# Patient Record
Sex: FEMALE | Race: WHITE | NOT HISPANIC OR LATINO | Employment: PART TIME | URBAN - METROPOLITAN AREA
[De-identification: names, ages, dates, MRNs, and addresses within clinical notes are randomized per-mention and may not be internally consistent; named-entity substitution may affect disease eponyms.]

---

## 2018-03-26 ENCOUNTER — TRANSCRIBE ORDERS (OUTPATIENT)
Dept: NUTRITION | Facility: HOSPITAL | Age: 58
End: 2018-03-26

## 2018-03-26 DIAGNOSIS — E66.01 MORBID OBESITY (HCC): Primary | ICD-10-CM

## 2018-04-13 ENCOUNTER — HOSPITAL ENCOUNTER (OUTPATIENT)
Dept: NUTRITION | Facility: HOSPITAL | Age: 58
Setting detail: RECURRING SERIES
Discharge: HOME | End: 2018-04-13

## 2018-04-13 VITALS — BODY MASS INDEX: 45.99 KG/M2 | HEIGHT: 67 IN | WEIGHT: 293 LBS

## 2018-04-13 PROCEDURE — 97802 MEDICAL NUTRITION INDIV IN: CPT | Performed by: DIETITIAN, REGISTERED

## 2018-05-11 ENCOUNTER — HOSPITAL ENCOUNTER (OUTPATIENT)
Dept: NUTRITION | Facility: HOSPITAL | Age: 58
Setting detail: RECURRING SERIES
End: 2018-05-11

## 2018-05-15 ENCOUNTER — TELEPHONE (OUTPATIENT)
Dept: NUTRITION | Facility: HOSPITAL | Age: 58
End: 2018-05-15

## 2018-05-16 NOTE — PROGRESS NOTES
Patient cancelled follow up appointment with RD due to family emergency. RD spoke with patient over the phone on her progress since coming in for initial appointment. Patient states she continues to do well with her weight loss goals. Reports she is still trying to focus on raw/whole/fresh foods and avoid packaged and processed foods. States she is juicing ~1 time/day. She does not know a current weight but states she knows she has lost weight this month as her clothes continue to feel more loose. Patient is away from home and does not have her calendar with her to reschedule appointment at this time and she will call back. She has no questions for RD today.     Goal completion:  1. Keep 1 week of food logs: 100%  2. Lose weight: 100% self reported    Patient is encouraged to call RD as needed. Thank you again for this referral.

## 2019-11-14 ENCOUNTER — PREP FOR SURGERY (OUTPATIENT)
Dept: OTHER | Facility: HOSPITAL | Age: 59
End: 2019-11-14

## 2019-11-14 DIAGNOSIS — N95.0 POST-MENOPAUSAL BLEEDING: Primary | ICD-10-CM

## 2019-11-15 ENCOUNTER — APPOINTMENT (OUTPATIENT)
Dept: PREADMISSION TESTING | Facility: HOSPITAL | Age: 59
End: 2019-11-15

## 2019-11-15 LAB
ALBUMIN SERPL-MCNC: 4.4 G/DL (ref 3.5–5.2)
ALBUMIN/GLOB SERPL: 1.3 G/DL
ALP SERPL-CCNC: 141 U/L (ref 39–117)
ALT SERPL W P-5'-P-CCNC: 41 U/L (ref 1–33)
ANION GAP SERPL CALCULATED.3IONS-SCNC: 11 MMOL/L (ref 5–15)
AST SERPL-CCNC: 36 U/L (ref 1–32)
BASOPHILS # BLD AUTO: 0.04 10*3/MM3 (ref 0–0.2)
BASOPHILS NFR BLD AUTO: 0.5 % (ref 0–1.5)
BILIRUB SERPL-MCNC: 1 MG/DL (ref 0.2–1.2)
BUN BLD-MCNC: 10 MG/DL (ref 6–20)
BUN/CREAT SERPL: 15.4 (ref 7–25)
CALCIUM SPEC-SCNC: 9.4 MG/DL (ref 8.6–10.5)
CHLORIDE SERPL-SCNC: 99 MMOL/L (ref 98–107)
CO2 SERPL-SCNC: 27 MMOL/L (ref 22–29)
CREAT BLD-MCNC: 0.65 MG/DL (ref 0.57–1)
DEPRECATED RDW RBC AUTO: 40.5 FL (ref 37–54)
EOSINOPHIL # BLD AUTO: 0.2 10*3/MM3 (ref 0–0.4)
EOSINOPHIL NFR BLD AUTO: 2.3 % (ref 0.3–6.2)
ERYTHROCYTE [DISTWIDTH] IN BLOOD BY AUTOMATED COUNT: 13.2 % (ref 12.3–15.4)
GFR SERPL CREATININE-BSD FRML MDRD: 94 ML/MIN/1.73
GLOBULIN UR ELPH-MCNC: 3.5 GM/DL
GLUCOSE BLD-MCNC: 210 MG/DL (ref 65–99)
HBA1C MFR BLD: 10.1 % (ref 4.8–5.6)
HCT VFR BLD AUTO: 48.4 % (ref 34–46.6)
HGB BLD-MCNC: 15.2 G/DL (ref 12–15.9)
IMM GRANULOCYTES # BLD AUTO: 0.03 10*3/MM3 (ref 0–0.05)
IMM GRANULOCYTES NFR BLD AUTO: 0.3 % (ref 0–0.5)
LYMPHOCYTES # BLD AUTO: 1.98 10*3/MM3 (ref 0.7–3.1)
LYMPHOCYTES NFR BLD AUTO: 22.5 % (ref 19.6–45.3)
MCH RBC QN AUTO: 26.7 PG (ref 26.6–33)
MCHC RBC AUTO-ENTMCNC: 31.4 G/DL (ref 31.5–35.7)
MCV RBC AUTO: 85.1 FL (ref 79–97)
MONOCYTES # BLD AUTO: 0.71 10*3/MM3 (ref 0.1–0.9)
MONOCYTES NFR BLD AUTO: 8.1 % (ref 5–12)
NEUTROPHILS # BLD AUTO: 5.85 10*3/MM3 (ref 1.7–7)
NEUTROPHILS NFR BLD AUTO: 66.3 % (ref 42.7–76)
NRBC BLD AUTO-RTO: 0 /100 WBC (ref 0–0.2)
PLATELET # BLD AUTO: 210 10*3/MM3 (ref 140–450)
PMV BLD AUTO: 10.9 FL (ref 6–12)
POTASSIUM BLD-SCNC: 4.4 MMOL/L (ref 3.5–5.2)
PROT SERPL-MCNC: 7.9 G/DL (ref 6–8.5)
RBC # BLD AUTO: 5.69 10*6/MM3 (ref 3.77–5.28)
SODIUM BLD-SCNC: 137 MMOL/L (ref 136–145)
WBC NRBC COR # BLD: 8.81 10*3/MM3 (ref 3.4–10.8)

## 2019-11-15 PROCEDURE — 93005 ELECTROCARDIOGRAM TRACING: CPT

## 2019-11-15 PROCEDURE — 93010 ELECTROCARDIOGRAM REPORT: CPT | Performed by: INTERNAL MEDICINE

## 2019-11-15 PROCEDURE — 85025 COMPLETE CBC W/AUTO DIFF WBC: CPT | Performed by: OBSTETRICS & GYNECOLOGY

## 2019-11-15 PROCEDURE — 83036 HEMOGLOBIN GLYCOSYLATED A1C: CPT | Performed by: OBSTETRICS & GYNECOLOGY

## 2019-11-15 PROCEDURE — 36415 COLL VENOUS BLD VENIPUNCTURE: CPT

## 2019-11-15 PROCEDURE — 80053 COMPREHEN METABOLIC PANEL: CPT | Performed by: OBSTETRICS & GYNECOLOGY

## 2019-11-19 ENCOUNTER — LAB REQUISITION (OUTPATIENT)
Dept: LAB | Facility: HOSPITAL | Age: 59
End: 2019-11-19

## 2019-11-19 DIAGNOSIS — N95.0 POSTMENOPAUSAL BLEEDING: ICD-10-CM

## 2019-11-19 PROCEDURE — 88305 TISSUE EXAM BY PATHOLOGIST: CPT | Performed by: OBSTETRICS & GYNECOLOGY

## 2019-11-21 LAB
CYTO UR: NORMAL
LAB AP CASE REPORT: NORMAL
LAB AP CLINICAL INFORMATION: NORMAL
PATH REPORT.FINAL DX SPEC: NORMAL
PATH REPORT.GROSS SPEC: NORMAL

## 2023-04-28 NOTE — PROGRESS NOTES
"[facility]  [facility]    Chief Complaint  Hypertension, Chest Pain, and Establish Care    Subjective    History of Present Illness {CC  Problem List  Visit  Diagnosis   Encounters  Notes  Medications  Labs  Result Review Imaging  Media :23}     Jenna Paul is a 62 y.o. female with hypertension, adrenal mass, diabetes, BOJORQUEZ who presents today for evaluation of chest pain at the request of Dr. Lamas.    She reports about 3 weeks ago she was in the hospital for a kidney stone. After discharge she had aching in her shoulders and her neck. She also had some \"aching\" in her chest. The heating pad helped. 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 Had another episode last night when she was sleeping. Reports that there is some reproducible tenderness. Denies exertional chest pain or shortness of breath. Does elliptical for short duration but multiple times a days    She reports that her blood pressure has been running 180-200s for years. She reports HCtZ cause excruciating leg cramps and would like to stop. Reports that if she takes the whole 200mg of metoprolol succinate it makes her nauseous, but if she separates it out she is fine.    She notes intermittent heart flutters several times a week.  Occasional orthostatic lightheadedness but no near syncope or syncope    Reports that she has been doing lifestyle changes. Brought her hgb a1c down from ?17 to 8.         Cardiac risks: HTN, diabetes, obesity, family hx (CABG 52)    Objective     Vital Signs:   Vitals:    05/01/23 0821 05/01/23 0824 05/01/23 0825   BP: (!) 187/106 (!) 185/94 (!) 185/97   BP Location: Right arm Left arm Left arm   Patient Position: Sitting Standing Sitting   Cuff Size: Adult Adult Adult   Pulse: 65 74 65   Resp:   18   Temp:   97.4 °F (36.3 °C)   TempSrc:   Temporal   SpO2: 96% 97% 97%   Weight:   115 kg (253 lb 6 oz)   Height:   170.2 cm (67\")     Body mass index is 39.68 " kg/m².  Physical Exam  Vitals reviewed.   Constitutional:       Appearance: Normal appearance.   HENT:      Head: Normocephalic.   Neck:      Vascular: No carotid bruit.   Cardiovascular:      Rate and Rhythm: Normal rate and regular rhythm.      Pulses: Normal pulses.      Heart sounds: Normal heart sounds, S1 normal and S2 normal. No murmur heard.  Pulmonary:      Effort: Pulmonary effort is normal. No respiratory distress.      Breath sounds: Normal breath sounds.   Chest:      Chest wall: No tenderness.   Abdominal:      General: Abdomen is flat.      Palpations: Abdomen is soft.   Musculoskeletal:      Cervical back: Neck supple.      Right lower leg: No edema.      Left lower leg: No edema.   Skin:     General: Skin is warm and dry.   Neurological:      General: No focal deficit present.      Mental Status: She is alert and oriented to person, place, and time. Mental status is at baseline.   Psychiatric:         Mood and Affect: Mood normal.         Behavior: Behavior normal.         Thought Content: Thought content normal.              Result Review  Data Reviewed:{ Labs  Result Review  Imaging  Med Tab  Media :23}   CBC with Auto Diff (04/11/2023 16:57)  Urinalysis, Reflex Microscopic and Culture If Indicated (04/11/2023 16:57)  Urinalysis, Reflex Microscopic and Culture If Indicated (03/24/2023 11:29)  BASIC METABOLIC PANEL (04/11/2023 16:57)  High Sensitivity Troponin I (04/11/2023 16:57)    EKG today shows normal sinus rhythm with first-degree AV block, cannot rule out anterior infarct.           Assessment and Plan {CC Problem List  Visit Diagnosis  ROS  Review (Popup)  Health Maintenance  Quality  BestPractice  Medications  SmartSets  SnapShot Encounters  Media :23}   1. Chest pain, unspecified type  -She does have multiple ASCVD risk, but symptoms seem atypical.  EKG appears stable.  Recent high sensitive troponin was normal.  We will first work on controlling blood pressure and then  consider ischemic evaluation in the near future  - ECG 12 Lead; Future  - Adult Transthoracic Echo Complete W/ Cont if Necessary Per Protocol; Future    2. Accelerated hypertension  -Due to intolerances to hydrochlorothiazide, will change it to spironolactone 25 mg daily and repeat labs in 1 week.  We will also try changing her metoprolol to carvedilol 25 mg twice a day for better blood pressure control.  Add amlodipine 5 mg daily in the morning.  Continue home monitoring and bring blood pressure readings and cuff to next visit  -Continue healthy lifestyle  - Adult Transthoracic Echo Complete W/ Cont if Necessary Per Protocol; Future    3. Palpitations    - Holter Monitor - 72 Hour Up To 15 Days; Future  - Adult Transthoracic Echo Complete W/ Cont if Necessary Per Protocol; Future        Follow Up {Instructions Charge Capture  Follow-up Communications :23}   Return in about 1 week (around 5/8/2023) for Office follow up, HTN.    Patient was given instructions and counseling regarding her condition or for health maintenance advice. Please see specific information pulled into the AVS if appropriate.  Advised to call the [facility] Center with any questions, concerns, or worsening symptoms.

## 2023-05-01 ENCOUNTER — OFFICE VISIT (OUTPATIENT)
Dept: CARDIOLOGY | Facility: HOSPITAL | Age: 63
End: 2023-05-01
Payer: MEDICAID

## 2023-05-01 ENCOUNTER — HOSPITAL ENCOUNTER (OUTPATIENT)
Dept: CARDIOLOGY | Facility: HOSPITAL | Age: 63
Discharge: HOME | End: 2023-05-01
Payer: MEDICAID

## 2023-05-01 VITALS
WEIGHT: 253.38 LBS | OXYGEN SATURATION: 97 % | HEIGHT: 67 IN | SYSTOLIC BLOOD PRESSURE: 185 MMHG | RESPIRATION RATE: 18 BRPM | TEMPERATURE: 97.4 F | BODY MASS INDEX: 39.77 KG/M2 | HEART RATE: 65 BPM | DIASTOLIC BLOOD PRESSURE: 97 MMHG

## 2023-05-01 DIAGNOSIS — R07.9 CHEST PAIN, UNSPECIFIED TYPE: Primary | ICD-10-CM

## 2023-05-01 DIAGNOSIS — R00.2 PALPITATIONS: ICD-10-CM

## 2023-05-01 DIAGNOSIS — R07.9 CHEST PAIN, UNSPECIFIED TYPE: ICD-10-CM

## 2023-05-01 DIAGNOSIS — I10 ACCELERATED HYPERTENSION: ICD-10-CM

## 2023-05-01 LAB
QT INTERVAL: 414 MS
QTC INTERVAL: 423 MS

## 2023-05-01 PROCEDURE — 93246 EXT ECG>7D<15D RECORDING: CPT

## 2023-05-01 PROCEDURE — 93005 ELECTROCARDIOGRAM TRACING: CPT | Performed by: NURSE PRACTITIONER

## 2023-05-01 RX ORDER — AMLODIPINE BESYLATE 5 MG/1
5 TABLET ORAL DAILY
Qty: 30 TABLET | Refills: 3 | Status: SHIPPED | OUTPATIENT
Start: 2023-05-01

## 2023-05-01 RX ORDER — MAGNESIUM OXIDE 400 MG/1
400 TABLET ORAL DAILY
COMMUNITY

## 2023-05-01 RX ORDER — AMPICILLIN TRIHYDRATE 250 MG
500 CAPSULE ORAL DAILY
COMMUNITY

## 2023-05-01 RX ORDER — LISINOPRIL AND HYDROCHLOROTHIAZIDE 25; 20 MG/1; MG/1
1 TABLET ORAL EVERY MORNING
COMMUNITY
Start: 2023-04-26 | End: 2023-05-01

## 2023-05-01 RX ORDER — LORATADINE 10 MG/1
10 TABLET ORAL DAILY PRN
COMMUNITY

## 2023-05-01 RX ORDER — LISINOPRIL 40 MG/1
40 TABLET ORAL NIGHTLY
Qty: 30 TABLET | Refills: 3 | Status: SHIPPED | OUTPATIENT
Start: 2023-05-01

## 2023-05-01 RX ORDER — IODINE SOLUTION STRONG 5% (LUGOL'S) 5 %
0.2 SOLUTION ORAL DAILY
COMMUNITY

## 2023-05-01 RX ORDER — METOPROLOL SUCCINATE 200 MG/1
1 TABLET, EXTENDED RELEASE ORAL DAILY
COMMUNITY
Start: 2023-04-13 | End: 2023-05-01

## 2023-05-01 RX ORDER — CLONIDINE HYDROCHLORIDE 0.1 MG/1
1 TABLET ORAL DAILY PRN
COMMUNITY
Start: 2023-04-13

## 2023-05-01 RX ORDER — GLIMEPIRIDE 1 MG/1
1 TABLET ORAL DAILY PRN
COMMUNITY

## 2023-05-01 RX ORDER — B-COMPLEX WITH VITAMIN C
1 TABLET ORAL DAILY
COMMUNITY

## 2023-05-01 RX ORDER — CARVEDILOL 25 MG/1
25 TABLET ORAL 2 TIMES DAILY
Qty: 60 TABLET | Refills: 3 | Status: SHIPPED | OUTPATIENT
Start: 2023-05-01

## 2023-05-01 RX ORDER — ALPHA LIPOIC ACID 150 MG
1 CAPSULE ORAL DAILY
COMMUNITY

## 2023-05-01 RX ORDER — SPIRONOLACTONE 25 MG/1
25 TABLET ORAL DAILY
Qty: 30 TABLET | Refills: 1 | Status: SHIPPED | OUTPATIENT
Start: 2023-05-01

## 2023-05-01 RX ORDER — LISINOPRIL 20 MG/1
20 TABLET ORAL
COMMUNITY
Start: 2023-04-04 | End: 2023-05-01

## 2023-05-01 NOTE — PROGRESS NOTES
[facility] Heart Monitor Documentation    Jenna Paul  1960  [id number]  05/01/23      [] ZIO XT Patch  Model [id number] Prescribed for N/A Days    · Serial Number: (N + 9 Digits) N   · Apply-By Date on Box:   · USPS Tracking Number:   · USPS Tracking        [] [employer]ian MINI PLUS Mobile Cardiac Telemetry  Model BGMINIPLUS Prescribed for N/A Days    · Serial Number: (BGM + 7 Digits) BGM  · Shipped-By Date on Box:   · UPS Tracking Number: [id number]  · UPS Tracking      [] [employer]ian MINI Holter Monitor  Model BGMINIEL Prescribed for 14 Days    · Serial Number: (7 Digits) [id number]  · Shipped-By Date on Box: [id number]  · UPS Tracking Number: [id number]  · UPS Tracking        This monitor was applied to the patient's chest and checked for proper functioning.  Ms. Jenna Paul was instructed in the proper use of this monitor.  She was given the opportunity to ask questions and left the office with the device 's instruction manual.    Capri Miranda MA, 09:14 EDT, 05/01/23                  [facility] 8[facility]8.2019

## 2023-05-01 NOTE — PATIENT INSTRUCTIONS
Stop lisinopril/hctz and stop lisinopril in the morning. I have sent in a new prescription for lisinopril 40mg at night  Start spironolactone and amlodipine in the morning  Stop metoprolol. Start carvedilol 25mg twice a day

## 2023-05-08 ENCOUNTER — HOSPITAL ENCOUNTER (OUTPATIENT)
Dept: CARDIOLOGY | Facility: HOSPITAL | Age: 63
Discharge: HOME | End: 2023-05-08
Admitting: NURSE PRACTITIONER
Payer: MEDICAID

## 2023-05-08 VITALS — BODY MASS INDEX: 39.79 KG/M2 | WEIGHT: 253.53 LBS | HEIGHT: 67 IN

## 2023-05-08 DIAGNOSIS — R07.9 CHEST PAIN, UNSPECIFIED TYPE: ICD-10-CM

## 2023-05-08 DIAGNOSIS — R00.2 PALPITATIONS: ICD-10-CM

## 2023-05-08 DIAGNOSIS — I10 ACCELERATED HYPERTENSION: ICD-10-CM

## 2023-05-08 LAB
ASCENDING AORTA: 3.8 CM
BH CV ECHO MEAS - AO MAX PG: 6.8 MMHG
BH CV ECHO MEAS - AO MEAN PG: 3.9 MMHG
BH CV ECHO MEAS - AO ROOT DIAM: 3.6 CM
BH CV ECHO MEAS - AO V2 MAX: 130.3 CM/SEC
BH CV ECHO MEAS - AO V2 VTI: 26.2 CM
BH CV ECHO MEAS - AVA(I,D): 2.27 CM2
BH CV ECHO MEAS - EDV(CUBED): 145 ML
BH CV ECHO MEAS - EDV(MOD-SP2): 83 ML
BH CV ECHO MEAS - EDV(MOD-SP4): 94 ML
BH CV ECHO MEAS - EF(MOD-BP): 61 %
BH CV ECHO MEAS - EF(MOD-SP2): 54.2 %
BH CV ECHO MEAS - EF(MOD-SP4): 69.1 %
BH CV ECHO MEAS - ESV(CUBED): 46.5 ML
BH CV ECHO MEAS - ESV(MOD-SP2): 38 ML
BH CV ECHO MEAS - ESV(MOD-SP4): 29 ML
BH CV ECHO MEAS - FS: 31.5 %
BH CV ECHO MEAS - IVS/LVPW: 1.23 CM
BH CV ECHO MEAS - IVSD: 1.2 CM
BH CV ECHO MEAS - LA DIMENSION: 3 CM
BH CV ECHO MEAS - LAT PEAK E' VEL: 8.3 CM/SEC
BH CV ECHO MEAS - LV DIASTOLIC VOL/BSA (35-75): 42.1 CM2
BH CV ECHO MEAS - LV MASS(C)D: 219.9 GRAMS
BH CV ECHO MEAS - LV MAX PG: 3.3 MMHG
BH CV ECHO MEAS - LV MEAN PG: 1.66 MMHG
BH CV ECHO MEAS - LV SYSTOLIC VOL/BSA (12-30): 13 CM2
BH CV ECHO MEAS - LV V1 MAX: 91.3 CM/SEC
BH CV ECHO MEAS - LV V1 VTI: 17 CM
BH CV ECHO MEAS - LVIDD: 5.3 CM
BH CV ECHO MEAS - LVIDS: 3.6 CM
BH CV ECHO MEAS - LVOT AREA: 3.5 CM2
BH CV ECHO MEAS - LVOT DIAM: 2.11 CM
BH CV ECHO MEAS - LVPWD: 0.97 CM
BH CV ECHO MEAS - MED PEAK E' VEL: 5.3 CM/SEC
BH CV ECHO MEAS - MV A MAX VEL: 81.9 CM/SEC
BH CV ECHO MEAS - MV DEC TIME: 0.16 MSEC
BH CV ECHO MEAS - MV E MAX VEL: 60.2 CM/SEC
BH CV ECHO MEAS - MV E/A: 0.73
BH CV ECHO MEAS - MV MAX PG: 3.1 MMHG
BH CV ECHO MEAS - MV MEAN PG: 1.26 MMHG
BH CV ECHO MEAS - MV V2 VTI: 24.1 CM
BH CV ECHO MEAS - MVA(VTI): 2.47 CM2
BH CV ECHO MEAS - PA ACC SLOPE: 495.9 CM/SEC2
BH CV ECHO MEAS - PA ACC TIME: 0.13 SEC
BH CV ECHO MEAS - PA PR(ACCEL): 22 MMHG
BH CV ECHO MEAS - PA V2 MAX: 91.3 CM/SEC
BH CV ECHO MEAS - SI(MOD-SP2): 20.1 ML/M2
BH CV ECHO MEAS - SI(MOD-SP4): 29.1 ML/M2
BH CV ECHO MEAS - SV(LVOT): 59.4 ML
BH CV ECHO MEAS - SV(MOD-SP2): 45 ML
BH CV ECHO MEAS - SV(MOD-SP4): 65 ML
BH CV ECHO MEAS - TAPSE (>1.6): 2.2 CM
BH CV ECHO MEASUREMENTS AVERAGE E/E' RATIO: 8.85
BH CV VAS BP LEFT ARM: NORMAL MMHG
BH CV XLRA - RV BASE: 3.4 CM
BH CV XLRA - RV LENGTH: 8 CM
BH CV XLRA - RV MID: 3.1 CM
BH CV XLRA - TDI S': 11.7 CM/SEC
IVRT: 137 MSEC
LEFT ATRIUM VOLUME INDEX: 20.2 ML/M2
MAXIMAL PREDICTED HEART RATE: 159 BPM
STRESS TARGET HR: 135 BPM

## 2023-05-08 PROCEDURE — 93306 TTE W/DOPPLER COMPLETE: CPT

## 2023-05-10 NOTE — PROGRESS NOTES
"[facility]  Heart and Valve Center    Chief Complaint  Follow-up and Hypertension    Subjective    History of Present Illness {CC  Problem List  Visit  Diagnosis   Encounters  Notes  Medications  Labs  Result Review Imaging  Media :23}     Jenna Paul is a 62 y.o. female with hypertension, adrenal mass, diabetes, BOJORQUEZ who presents today to follow-up on uncontrolled hypertension.    She reports that her blood pressure has been running 180-200s for years.  She complained of excruciating leg cramps to hydrochlorothiazide so this was discontinued at her last office visit.  Her metoprolol was changed to carvedilol 25 mg twice a day, she was started on spironolactone 25 mg daily and amlodipine 5 mg daily.    She reports that her blood pressures have steadily declined. Yesterday her systolic BP was 109 and she felt lightheaded and started seeing spots. She held her spironolactone and amlodipine today. Palpitations and chest pain has resolved             Cardiac risks: HTN, diabetes, obesity, family hx (CABG 52)    Objective     Vital Signs:   Vitals:    05/11/23 0811   BP: 140/74   BP Location: Left arm   Patient Position: Sitting   Cuff Size: Adult   Pulse: 85   Resp: 20   Temp: 97.1 °F (36.2 °C)   TempSrc: Temporal   SpO2: 96%   Weight: 113 kg (248 lb 8 oz)   Height: 170.2 cm (67\")     Body mass index is 38.92 kg/m².  Physical Exam  Vitals reviewed.   Constitutional:       Appearance: Normal appearance.   HENT:      Head: Normocephalic.   Neck:      Vascular: No carotid bruit.   Cardiovascular:      Rate and Rhythm: Normal rate and regular rhythm.      Pulses: Normal pulses.      Heart sounds: Normal heart sounds, S1 normal and S2 normal. No murmur heard.  Pulmonary:      Effort: Pulmonary effort is normal. No respiratory distress.      Breath sounds: Normal breath sounds.   Chest:      Chest wall: No tenderness.   Abdominal:      General: Abdomen is flat.      Palpations: Abdomen is soft. "   Musculoskeletal:      Cervical back: Neck supple.      Right lower leg: No edema.      Left lower leg: No edema.   Skin:     General: Skin is warm and dry.   Neurological:      General: No focal deficit present.      Mental Status: She is alert and oriented to person, place, and time. Mental status is at baseline.   Psychiatric:         Mood and Affect: Mood normal.         Behavior: Behavior normal.         Thought Content: Thought content normal.              Result Review  Data Reviewed:{ Labs  Result Review  Imaging  Med Tab  Media :23}   CBC with Auto Diff (04/11/2023 16:57)  Urinalysis, Reflex Microscopic and Culture If Indicated (04/11/2023 16:57)  Urinalysis, Reflex Microscopic and Culture If Indicated (03/24/2023 11:29)  BASIC METABOLIC PANEL (04/11/2023 16:57)  High Sensitivity Troponin I (04/11/2023 16:57)    EKG today shows normal sinus rhythm with first-degree AV block, cannot rule out anterior infarct.           Assessment and Plan {CC Problem List  Visit Diagnosis  ROS  Review (Popup)  Health Maintenance  Quality  BestPractice  Medications  SmartSets  SnapShot Encounters  Media :23}   1. Chest pain, unspecified type  -Due to multiple ASCVD risks will go ahead and schedule stress PET. Needs PET imaging due to body habitus.  Patient unable to walk on treadmill due to knee pain    2. Hypertension  -Blood pressure control is improving.  Her blood pressure dropped yesterday to the low 100s and she was symptomatic.  Will hold amlodipine for now.  Continue spironolactone, lisinopril and carvedilol  -BMP today  -She brought in her home blood pressure cuff today which read 138/90.  Systolic blood pressure was within 2 points of ours.  Continue home monitoring and call if systolic blood pressure consistently greater than 130    3. Palpitations  - Currently wearing 14 day holter        Follow Up {Instructions Charge Capture  Follow-up Communications :23}   Return in about 4 weeks (around  6/8/2023) for Monitor results.    Patient was given instructions and counseling regarding her condition or for health maintenance advice. Please see specific information pulled into the AVS if appropriate.  Advised to call the [facility] Center with any questions, concerns, or worsening symptoms.

## 2023-05-11 ENCOUNTER — OFFICE VISIT (OUTPATIENT)
Dept: CARDIOLOGY | Facility: HOSPITAL | Age: 63
End: 2023-05-11
Payer: MEDICAID

## 2023-05-11 ENCOUNTER — LAB (OUTPATIENT)
Dept: LAB | Facility: HOSPITAL | Age: 63
End: 2023-05-11
Payer: MEDICAID

## 2023-05-11 VITALS
BODY MASS INDEX: 39 KG/M2 | HEART RATE: 85 BPM | RESPIRATION RATE: 20 BRPM | SYSTOLIC BLOOD PRESSURE: 140 MMHG | HEIGHT: 67 IN | OXYGEN SATURATION: 96 % | DIASTOLIC BLOOD PRESSURE: 74 MMHG | TEMPERATURE: 97.1 F | WEIGHT: 248.5 LBS

## 2023-05-11 DIAGNOSIS — L98.9 SKIN LESIONS: ICD-10-CM

## 2023-05-11 DIAGNOSIS — E11.65 TYPE 2 DIABETES MELLITUS WITH HYPERGLYCEMIA, WITHOUT LONG-TERM CURRENT USE OF INSULIN: ICD-10-CM

## 2023-05-11 DIAGNOSIS — R07.9 CHEST PAIN, UNSPECIFIED TYPE: ICD-10-CM

## 2023-05-11 DIAGNOSIS — I10 ESSENTIAL HYPERTENSION: Primary | ICD-10-CM

## 2023-05-11 DIAGNOSIS — I10 ESSENTIAL HYPERTENSION: ICD-10-CM

## 2023-05-11 LAB
ANION GAP SERPL CALCULATED.3IONS-SCNC: 10 MMOL/L (ref 5–15)
BUN SERPL-MCNC: 17 MG/DL (ref 8–23)
BUN/CREAT SERPL: 24.6 (ref 7–25)
CALCIUM SPEC-SCNC: 9.1 MG/DL (ref 8.6–10.5)
CHLORIDE SERPL-SCNC: 102 MMOL/L (ref 98–107)
CO2 SERPL-SCNC: 24 MMOL/L (ref 22–29)
CREAT SERPL-MCNC: 0.69 MG/DL (ref 0.57–1)
EGFRCR SERPLBLD CKD-EPI 2021: 98.9 ML/MIN/1.73
GLUCOSE SERPL-MCNC: 322 MG/DL (ref 65–99)
POTASSIUM SERPL-SCNC: 4.4 MMOL/L (ref 3.5–5.2)
SODIUM SERPL-SCNC: 136 MMOL/L (ref 136–145)

## 2023-05-11 PROCEDURE — 80048 BASIC METABOLIC PNL TOTAL CA: CPT

## 2023-05-11 PROCEDURE — 36415 COLL VENOUS BLD VENIPUNCTURE: CPT

## 2023-05-11 NOTE — PROGRESS NOTES
Your labs are within normal limits.  Please let me know if you have any further questions or concerns

## 2023-05-22 ENCOUNTER — TELEPHONE (OUTPATIENT)
Dept: CARDIOLOGY | Facility: HOSPITAL | Age: 63
End: 2023-05-22

## 2023-05-22 NOTE — TELEPHONE ENCOUNTER
Caller: Jenna Paul    Relationship: Self    Best call back number: [phone]    What is the best time to reach you: NONE            What was the call regarding: PATIENT NEEDS TO CANCELL HER STRESS TEST FOR TOMORROW (05.23.23). SHE HAS HAD A FAMILY EMERGENCY. SHE WANTS TO CALL BACK TO RE-SCHEDULE.    Do you require a callback: NO

## 2023-08-21 RX ORDER — AMLODIPINE BESYLATE 5 MG/1
TABLET ORAL
Qty: 30 TABLET | Refills: 3 | OUTPATIENT
Start: 2023-08-21

## 2023-08-21 RX ORDER — LISINOPRIL 40 MG/1
40 TABLET ORAL
Qty: 30 TABLET | Refills: 3 | Status: SHIPPED | OUTPATIENT
Start: 2023-08-21

## 2023-08-21 RX ORDER — CARVEDILOL 25 MG/1
25 TABLET ORAL 2 TIMES DAILY
Qty: 60 TABLET | Refills: 3 | Status: SHIPPED | OUTPATIENT
Start: 2023-08-21

## 2023-11-10 ENCOUNTER — OFFICE VISIT (OUTPATIENT)
Dept: ENDOCRINOLOGY | Facility: CLINIC | Age: 63
End: 2023-11-10
Payer: MEDICAID

## 2023-11-10 VITALS
BODY MASS INDEX: 38.92 KG/M2 | SYSTOLIC BLOOD PRESSURE: 142 MMHG | WEIGHT: 248 LBS | OXYGEN SATURATION: 98 % | HEIGHT: 67 IN | HEART RATE: 82 BPM | DIASTOLIC BLOOD PRESSURE: 76 MMHG

## 2023-11-10 DIAGNOSIS — E04.1 THYROID NODULE: ICD-10-CM

## 2023-11-10 DIAGNOSIS — E11.65 TYPE 2 DIABETES MELLITUS WITH HYPERGLYCEMIA, WITHOUT LONG-TERM CURRENT USE OF INSULIN: Primary | ICD-10-CM

## 2023-11-10 DIAGNOSIS — D35.01 ADRENAL CORTICAL ADENOMA OF RIGHT ADRENAL GLAND: ICD-10-CM

## 2023-11-10 PROBLEM — E04.2 NONTOXIC MULTINODULAR GOITER: Status: ACTIVE | Noted: 2023-11-10

## 2023-11-10 LAB
EXPIRATION DATE: ABNORMAL
EXPIRATION DATE: ABNORMAL
GLUCOSE BLDC GLUCOMTR-MCNC: 300 MG/DL (ref 70–130)
HBA1C MFR BLD: 10.2 % (ref 4.5–5.7)
Lab: ABNORMAL
Lab: ABNORMAL
T4 FREE SERPL-MCNC: 1.22 NG/DL (ref 0.93–1.7)
TSH SERPL DL<=0.05 MIU/L-ACNC: 1.02 UIU/ML (ref 0.27–4.2)

## 2023-11-10 PROCEDURE — 84443 ASSAY THYROID STIM HORMONE: CPT | Performed by: INTERNAL MEDICINE

## 2023-11-10 PROCEDURE — 84439 ASSAY OF FREE THYROXINE: CPT | Performed by: INTERNAL MEDICINE

## 2023-11-10 NOTE — ASSESSMENT & PLAN NOTE
In poor control and at high risk of complications. Her diet has been quite poor. I recommended using a glp1 according to ada guidelines. She does not want to do that yet . She states she will improve her diet. Will recheck A1c in 3 months and readdress

## 2023-11-10 NOTE — ASSESSMENT & PLAN NOTE
Huge right thyroid nodule.  Will check tft's to make sure it's not a hot nodule   Assuming it's not, will refer for surgery. Pt concurs with the plan

## 2023-11-10 NOTE — PROGRESS NOTES
Office Note      Date: 11/10/2023  Patient Name: Jenna Paul  MRN: [id number]  : 1960    Chief Complaint   Patient presents with    Diabetes     Diabetes type 2       History of Present Illness:   Jenna Paul is a 63 y.o. female who presents for Diabetes (Diabetes type 2)  . And adrenal nodule   Diagnosed  with type 2 in 2016. Treated with  metformin (did not tolerate due to leg pain)  Then sfu/  then a good diet and states she did well.  Never took injectables    Bg checks are done daily  Typically her numbers are about 200    Complications:                         Eyes- none                         Kidneys- ok                         Feet- has symptoms                          Liver- has nask                        Vascular- none  Associated conditions: overweight,     Nutrition: eats 2 meals per day/  Physical activity: housework   Ct scan in April was read as showing nodule in right adrenal gland. Repeat in April was read as the nodule being in the left adrenal gland. We called radiology to clarify. The nodule is in the right adrenal gland.  It is 1.5 cm in size.  She had negative dex suppression test/ normal 24 hour urine cortisol  Normal plasma catecols.  Efrain was 8 , renin 0.5 but she was on aldactone at the time.  She has noted dramatic improvement in symptoms and bp since starting aldactone       Subjective          Review of Systems:   Review of Systems   Constitutional:  Positive for activity change and appetite change.   HENT:  Positive for trouble swallowing.    Endocrine: Positive for polydipsia and polyuria.   Musculoskeletal:  Positive for back pain.   Neurological:  Positive for headaches.       The following portions of the patient's history were reviewed and updated as appropriate: allergies, current medications, past family history, past medical history, past social history, past surgical history, and problem list.    Objective     Visit Vitals  /76 (BP Location: Left  "arm, Patient Position: Sitting, Cuff Size: Adult)   Pulse 82   Ht 170.2 cm (67\")   Wt 112 kg (248 lb)   SpO2 98%   BMI 38.84 kg/m²           Physical Exam:  Physical Exam  Vitals reviewed.   HENT:      Head: Normocephalic and atraumatic.   Eyes:      Extraocular Movements: Extraocular movements intact.   Neck:      Comments: Large right neck mass  Cardiovascular:      Rate and Rhythm: Normal rate.   Pulmonary:      Effort: Pulmonary effort is normal.   Lymphadenopathy:      Cervical: No cervical adenopathy.   Skin:     General: Skin is warm.   Neurological:      General: No focal deficit present.   Psychiatric:         Mood and Affect: Mood normal.         Thought Content: Thought content normal.         Judgment: Judgment normal.         Assessment / Plan      Assessment & Plan:  Problem List Items Addressed This Visit          Other    Type 2 diabetes mellitus with hyperglycemia, without long-term current use of insulin - Primary    Overview     Diagnosed in 2016. Treated with  metformin (did not tolerate due to leg pain)  Then sfu/  then a good diet and states she did well.  Never took injectables    Bg checks are done daily  Typically her numbers are about 200    Complications:                         Eyes- none                         Kidneys- ok                         Feet- has symptoms                          Liver- has nask                        Vascular- none  Associated conditions: overweight,     Nutrition: eats 2 meals per day/  Physical activity: housework          Current Assessment & Plan     In poor control and at high risk of complications. Her diet has been quite poor. I recommended using a glp1 according to ada guidelines. She does not want to do that yet . She states she will improve her diet. Will recheck A1c in 3 months and readdress          Relevant Medications    glimepiride (AMARYL) 1 MG tablet    Other Relevant Orders    POC Glycosylated Hemoglobin (Hb A1C) (Completed)    POC Glucose, " Blood (Completed)    Thyroid nodule    Overview     Nodule noted in right neck on exam  -----------------------------------  Procedure: thyroid ultrasound  Indications: nodule noted on exam  Results: left lobe is small and heterogeneous. Right lobe is replaced by a large hyperechoic nodule with surrounding halo measuring over 4 cm in size            Current Assessment & Plan     Huge right thyroid nodule.  Will check tft's to make sure it's not a hot nodule   Assuming it's not, will refer for surgery. 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 Repeat in April was read as the nodule being in the left adrenal gland. We called radiology to clarify. The nodule is in the right adrenal gland.  It is 1.5 cm in size.  She had negative dex suppression test/ normal 24 hour urine cortisol  Normal plasma catecols.  Efrain was 8 , renin 0.5 but she was on aldactone at the time.  She has noted dramatic improvement in symptoms and bp since starting aldactone          Current Assessment & Plan     This is not a pheo, she does not have cushings.  We cannot tell if this is an aldosteronoma as she was already on aldactone but her gratifying response to the medication suggests that it might be.  She is doing so well with the medication there is no need to intervene.   If we lose control of her bp , we could stop the aldactone and reassess the patient for conn's syndrome with an eye towards removing the adenoma in future if needed.   She will certainly need repeat Ct in spring              Electronically signed by  : Mehdi Velasquez MD   11/10/2023

## 2023-11-10 NOTE — ASSESSMENT & PLAN NOTE
This is not a pheo, she does not have cushings.  We cannot tell if this is an aldosteronoma as she was already on aldactone but her gratifying response to the medication suggests that it might be.  She is doing so well with the medication there is no need to intervene.   If we lose control of her bp , we could stop the aldactone and reassess the patient for conn's syndrome with an eye towards removing the adenoma in future if needed.   She will certainly need repeat Ct in spring

## 2023-12-11 RX ORDER — LISINOPRIL 40 MG/1
40 TABLET ORAL
Qty: 30 TABLET | Refills: 0 | Status: SHIPPED | OUTPATIENT
Start: 2023-12-11

## 2023-12-11 RX ORDER — CARVEDILOL 25 MG/1
25 TABLET ORAL 2 TIMES DAILY
Qty: 60 TABLET | Refills: 0 | Status: SHIPPED | OUTPATIENT
Start: 2023-12-11

## 2024-01-09 RX ORDER — CARVEDILOL 25 MG/1
25 TABLET ORAL 2 TIMES DAILY
Qty: 60 TABLET | Refills: 0 | OUTPATIENT
Start: 2024-01-09

## 2024-01-09 RX ORDER — LISINOPRIL 40 MG/1
40 TABLET ORAL
Qty: 30 TABLET | Refills: 0 | OUTPATIENT
Start: 2024-01-09

## 2024-06-16 ENCOUNTER — HOSPITAL ENCOUNTER (EMERGENCY)
Age: 64
Discharge: HOME | End: 2024-06-16
Payer: COMMERCIAL

## 2024-06-16 VITALS
OXYGEN SATURATION: 96 % | DIASTOLIC BLOOD PRESSURE: 106 MMHG | SYSTOLIC BLOOD PRESSURE: 166 MMHG | TEMPERATURE: 98.06 F | RESPIRATION RATE: 16 BRPM | HEART RATE: 81 BPM

## 2024-06-16 VITALS — SYSTOLIC BLOOD PRESSURE: 148 MMHG | OXYGEN SATURATION: 94 % | DIASTOLIC BLOOD PRESSURE: 67 MMHG | HEART RATE: 65 BPM

## 2024-06-16 VITALS
DIASTOLIC BLOOD PRESSURE: 67 MMHG | OXYGEN SATURATION: 94 % | TEMPERATURE: 98.06 F | HEART RATE: 65 BPM | SYSTOLIC BLOOD PRESSURE: 148 MMHG | RESPIRATION RATE: 16 BRPM

## 2024-06-16 VITALS — BODY MASS INDEX: 38.8 KG/M2

## 2024-06-16 DIAGNOSIS — E11.65: ICD-10-CM

## 2024-06-16 DIAGNOSIS — M51.26: ICD-10-CM

## 2024-06-16 DIAGNOSIS — R10.32: ICD-10-CM

## 2024-06-16 DIAGNOSIS — N39.0: Primary | ICD-10-CM

## 2024-06-16 DIAGNOSIS — R11.0: ICD-10-CM

## 2024-06-16 LAB
ALBUMIN LEVEL: 4 G/DL (ref 3.5–5)
ALBUMIN/GLOB SERPL: 1.2 {RATIO} (ref 1.1–1.8)
ALP ISO SERPL-ACNC: 126 U/L (ref 38–126)
ALT SERPLBLD-CCNC: 39 U/L (ref 12–78)
ANION GAP SERPL CALC-SCNC: 9.2 MEQ/L (ref 5–15)
AST SERPL QL: 37 U/L (ref 14–36)
BACTERIA URNS QL MICRO: (no result) /LPF
BILIRUBIN,TOTAL: 0.6 MG/DL (ref 0.2–1.3)
BUN SERPL-MCNC: 10 MG/DL (ref 7–17)
CALCIUM SPEC-MCNC: 9.1 MG/DL (ref 8.4–10.2)
CHLORIDE SPEC-SCNC: 101 MMOL/L (ref 98–107)
CO2 SERPL-SCNC: 29 MMOL/L (ref 22–30)
COLOR UR: YELLOW
CREATININE CLEARANCE ESTIMATED: 104 ML/MIN (ref 50–200)
CREATININE,SERUM: 0.5 MG/DL (ref 0.52–1.04)
ESTIMATED GLOMERULAR FILT RATE: 125 ML/MIN (ref 60–?)
GFR (AFRICAN AMERICAN): 151 ML/MIN (ref 60–?)
GLOBULIN SER CALC-MCNC: 3.4 G/DL (ref 1.3–3.2)
GLUCOSE UR QL STRIP.AUTO: (no result)
GLUCOSE: 336 MG/DL (ref 74–100)
HCT VFR BLD CALC: 46.6 % (ref 37–47)
HGB BLD-MCNC: 14.9 G/DL (ref 12.2–16.2)
LEUKOCYTE ESTERASE UR QL STRIP: (no result)
LIPASE: 68 U/L (ref 23–300)
MCHC RBC-ENTMCNC: 32.1 G/DL (ref 31.8–35.4)
MCV RBC: 88.2 FL (ref 81–99)
MEAN CORPUSCULAR HEMOGLOBIN: 28.3 PG (ref 27–31.2)
MICRO URNS: (no result)
PH UR: 6 [PH] (ref 5–8.5)
PLATELET # BLD: 214 K/MM3 (ref 142–424)
POTASSIUM: 4.2 MMOL/L (ref 3.5–5.1)
PROT SERPL-MCNC: 7.4 G/DL (ref 6.3–8.2)
RBC # BLD AUTO: 5.28 M/MM3 (ref 4.2–5.4)
SODIUM SPEC-SCNC: 135 MMOL/L (ref 136–145)
SP GR UR: 1.02 (ref 1–1.03)
UROBILINOGEN UR QL: 0.2 EU/DL
WBC # BLD AUTO: 7.6 K/MM3 (ref 4.8–10.8)
WBC # UR: (no result) #/HPF (ref 0–3)

## 2024-06-16 PROCEDURE — 96361 HYDRATE IV INFUSION ADD-ON: CPT

## 2024-06-16 PROCEDURE — 80053 COMPREHEN METABOLIC PANEL: CPT

## 2024-06-16 PROCEDURE — 96375 TX/PRO/DX INJ NEW DRUG ADDON: CPT

## 2024-06-16 PROCEDURE — 99284 EMERGENCY DEPT VISIT MOD MDM: CPT

## 2024-06-16 PROCEDURE — 96374 THER/PROPH/DIAG INJ IV PUSH: CPT

## 2024-06-16 PROCEDURE — 74177 CT ABD & PELVIS W/CONTRAST: CPT

## 2024-06-16 PROCEDURE — 81001 URINALYSIS AUTO W/SCOPE: CPT

## 2024-06-16 PROCEDURE — 83690 ASSAY OF LIPASE: CPT

## 2024-06-16 PROCEDURE — 85025 COMPLETE CBC W/AUTO DIFF WBC: CPT

## 2024-06-24 ENCOUNTER — HOSPITAL ENCOUNTER (EMERGENCY)
Dept: HOSPITAL 22 - UTC | Age: 64
Discharge: HOME | End: 2024-06-24
Payer: COMMERCIAL

## 2024-06-24 VITALS
DIASTOLIC BLOOD PRESSURE: 83 MMHG | OXYGEN SATURATION: 94 % | TEMPERATURE: 97.88 F | HEART RATE: 64 BPM | RESPIRATION RATE: 18 BRPM | SYSTOLIC BLOOD PRESSURE: 156 MMHG

## 2024-06-24 VITALS
RESPIRATION RATE: 18 BRPM | HEART RATE: 74 BPM | SYSTOLIC BLOOD PRESSURE: 160 MMHG | TEMPERATURE: 97.9 F | OXYGEN SATURATION: 97 % | DIASTOLIC BLOOD PRESSURE: 91 MMHG

## 2024-06-24 VITALS — OXYGEN SATURATION: 95 % | SYSTOLIC BLOOD PRESSURE: 138 MMHG | HEART RATE: 71 BPM | DIASTOLIC BLOOD PRESSURE: 93 MMHG

## 2024-06-24 VITALS
HEART RATE: 76 BPM | DIASTOLIC BLOOD PRESSURE: 90 MMHG | SYSTOLIC BLOOD PRESSURE: 150 MMHG | RESPIRATION RATE: 18 BRPM | OXYGEN SATURATION: 98 %

## 2024-06-24 VITALS — SYSTOLIC BLOOD PRESSURE: 150 MMHG | OXYGEN SATURATION: 99 % | HEART RATE: 66 BPM | DIASTOLIC BLOOD PRESSURE: 85 MMHG

## 2024-06-24 VITALS — BODY MASS INDEX: 38.3 KG/M2

## 2024-06-24 VITALS — HEART RATE: 67 BPM | DIASTOLIC BLOOD PRESSURE: 85 MMHG | OXYGEN SATURATION: 94 % | SYSTOLIC BLOOD PRESSURE: 152 MMHG

## 2024-06-24 DIAGNOSIS — B96.1: ICD-10-CM

## 2024-06-24 DIAGNOSIS — R10.30: Primary | ICD-10-CM

## 2024-06-24 DIAGNOSIS — M54.59: ICD-10-CM

## 2024-06-24 DIAGNOSIS — I10: ICD-10-CM

## 2024-06-24 DIAGNOSIS — R11.0: ICD-10-CM

## 2024-06-24 DIAGNOSIS — E11.65: ICD-10-CM

## 2024-06-24 LAB
ALBUMIN LEVEL: 4 G/DL (ref 3.5–5)
ALBUMIN/GLOB SERPL: 1.2 {RATIO} (ref 1.1–1.8)
ALP ISO SERPL-ACNC: 125 U/L (ref 38–126)
ALT SERPLBLD-CCNC: 42 U/L (ref 12–78)
ANION GAP SERPL CALC-SCNC: 10.2 MEQ/L (ref 5–15)
AST SERPL QL: 38 U/L (ref 14–36)
BILIRUBIN,TOTAL: 0.8 MG/DL (ref 0.2–1.3)
BUN SERPL-MCNC: 14 MG/DL (ref 7–17)
CALCIUM SPEC-MCNC: 9.2 MG/DL (ref 8.4–10.2)
CHLORIDE SPEC-SCNC: 101 MMOL/L (ref 98–107)
CO2 SERPL-SCNC: 27 MMOL/L (ref 22–30)
CREATININE CLEARANCE ESTIMATED: 102 ML/MIN (ref 50–200)
CREATININE,SERUM: 0.6 MG/DL (ref 0.52–1.04)
ESTIMATED GLOMERULAR FILT RATE: 101 ML/MIN (ref 60–?)
GFR (AFRICAN AMERICAN): 123 ML/MIN (ref 60–?)
GLOBULIN SER CALC-MCNC: 3.4 G/DL (ref 1.3–3.2)
GLUCOSE BLDC GLUCOMTR-MCNC: 435 MG/DL (ref 70–110)
GLUCOSE,URINE (UA): 1000
GLUCOSE: 378 MG/DL (ref 74–100)
HBA1C MFR BLD: 11.3 % (ref 4–6)
HCT VFR BLD CALC: 46.1 % (ref 37–47)
HGB BLD-MCNC: 14.7 G/DL (ref 12.2–16.2)
MAGNESIUM: 1.9 MG/DL (ref 1.6–2.3)
MCHC RBC-ENTMCNC: 31.8 G/DL (ref 31.8–35.4)
MCV RBC: 89.1 FL (ref 81–99)
MEAN CORPUSCULAR HEMOGLOBIN: 28.3 PG (ref 27–31.2)
PH,URINE: 5.5 (ref 5–8.5)
PLATELET # BLD: 181 K/MM3 (ref 142–424)
POTASSIUM: 4.2 MMOL/L (ref 3.5–5.1)
PROT SERPL-MCNC: 7.4 G/DL (ref 6.3–8.2)
RBC # BLD AUTO: 5.18 M/MM3 (ref 4.2–5.4)
SODIUM SPEC-SCNC: 134 MMOL/L (ref 136–145)
SPECIFIC GRAVITY, URINE: 1.01 (ref 1–1.03)
UROBILINOGEN,URINE: 1 EU/DL
WBC # BLD AUTO: 7.9 K/MM3 (ref 4.8–10.8)

## 2024-06-24 PROCEDURE — 85025 COMPLETE CBC W/AUTO DIFF WBC: CPT

## 2024-06-24 PROCEDURE — 83735 ASSAY OF MAGNESIUM: CPT

## 2024-06-24 PROCEDURE — 87186 SC STD MICRODIL/AGAR DIL: CPT

## 2024-06-24 PROCEDURE — 99283 EMERGENCY DEPT VISIT LOW MDM: CPT

## 2024-06-24 PROCEDURE — 83605 ASSAY OF LACTIC ACID: CPT

## 2024-06-24 PROCEDURE — 83036 HEMOGLOBIN GLYCOSYLATED A1C: CPT

## 2024-06-24 PROCEDURE — 87086 URINE CULTURE/COLONY COUNT: CPT

## 2024-06-24 PROCEDURE — 80053 COMPREHEN METABOLIC PANEL: CPT

## 2024-06-24 PROCEDURE — 81003 URINALYSIS AUTO W/O SCOPE: CPT

## 2024-06-24 PROCEDURE — 82009 KETONE BODYS QUAL: CPT

## 2024-06-24 PROCEDURE — 87088 URINE BACTERIA CULTURE: CPT

## 2024-06-24 PROCEDURE — 82962 GLUCOSE BLOOD TEST: CPT

## 2024-07-18 ENCOUNTER — HOSPITAL ENCOUNTER (OUTPATIENT)
Age: 64
Discharge: HOME | End: 2024-07-18
Payer: COMMERCIAL

## 2024-07-18 ENCOUNTER — HOSPITAL ENCOUNTER (OUTPATIENT)
Dept: HOSPITAL 22 - RAD | Age: 64
Discharge: HOME | End: 2024-07-18
Payer: COMMERCIAL

## 2024-07-18 VITALS
OXYGEN SATURATION: 98 % | HEART RATE: 89 BPM | RESPIRATION RATE: 20 BRPM | DIASTOLIC BLOOD PRESSURE: 98 MMHG | SYSTOLIC BLOOD PRESSURE: 179 MMHG

## 2024-07-18 VITALS — BODY MASS INDEX: 38.8 KG/M2

## 2024-07-18 DIAGNOSIS — M54.50: Primary | ICD-10-CM

## 2024-07-18 DIAGNOSIS — M46.1: ICD-10-CM

## 2024-07-18 DIAGNOSIS — M51.16: Primary | ICD-10-CM

## 2024-07-18 PROCEDURE — 99202 OFFICE O/P NEW SF 15 MIN: CPT

## 2024-07-18 PROCEDURE — 72110 X-RAY EXAM L-2 SPINE 4/>VWS: CPT

## 2024-07-18 PROCEDURE — 72202 X-RAY EXAM SI JOINTS 3/> VWS: CPT

## 2024-07-18 PROCEDURE — G0463 HOSPITAL OUTPT CLINIC VISIT: HCPCS

## 2024-08-26 ENCOUNTER — HOSPITAL ENCOUNTER (OUTPATIENT)
Age: 64
Discharge: HOME | End: 2024-08-26
Payer: COMMERCIAL

## 2024-08-26 VITALS
HEART RATE: 73 BPM | RESPIRATION RATE: 16 BRPM | SYSTOLIC BLOOD PRESSURE: 187 MMHG | OXYGEN SATURATION: 98 % | DIASTOLIC BLOOD PRESSURE: 105 MMHG

## 2024-08-26 VITALS — BODY MASS INDEX: 37.5 KG/M2

## 2024-08-26 DIAGNOSIS — M25.511: Primary | ICD-10-CM

## 2024-08-26 DIAGNOSIS — Z79.85: ICD-10-CM

## 2024-08-26 PROCEDURE — G0463 HOSPITAL OUTPT CLINIC VISIT: HCPCS

## 2024-08-26 PROCEDURE — 99212 OFFICE O/P EST SF 10 MIN: CPT

## 2024-10-01 ENCOUNTER — HOSPITAL ENCOUNTER (OUTPATIENT)
Dept: HOSPITAL 22 - LAB.DROPOF | Age: 64
Discharge: HOME | End: 2024-10-01
Payer: COMMERCIAL

## 2024-10-01 DIAGNOSIS — R39.9: ICD-10-CM

## 2024-10-01 DIAGNOSIS — Z79.84: ICD-10-CM

## 2024-10-01 DIAGNOSIS — Z13.21: Primary | ICD-10-CM

## 2024-10-01 DIAGNOSIS — R25.2: ICD-10-CM

## 2024-10-01 DIAGNOSIS — E11.9: ICD-10-CM

## 2024-10-01 LAB
25-OH VITAMIN D, TOTAL: 27.6 NG/ML (ref 30–100)
ALBUMIN LEVEL: 4 G/DL (ref 3.5–5)
ALBUMIN/GLOB SERPL: 1.4 {RATIO} (ref 1.1–1.8)
ALP ISO SERPL-ACNC: 121 U/L (ref 38–126)
ALT SERPLBLD-CCNC: 37 U/L (ref 12–78)
ANION GAP SERPL CALC-SCNC: 9.1 MEQ/L (ref 5–15)
AST SERPL QL: 36 U/L (ref 14–36)
BILIRUBIN,TOTAL: 0.9 MG/DL (ref 0.2–1.3)
BUN SERPL-MCNC: 10 MG/DL (ref 7–17)
CALCIUM SPEC-MCNC: 9.2 MG/DL (ref 8.4–10.2)
CHLORIDE SPEC-SCNC: 103 MMOL/L (ref 98–107)
CHOLEST SPEC-SCNC: 174 MG/DL (ref 140–200)
CO2 SERPL-SCNC: 25 MMOL/L (ref 22–30)
CREAT BLD-SCNC: 0.5 MG/DL (ref 0.52–1.04)
ESTIMATED GLOMERULAR FILT RATE: 125 ML/MIN (ref 60–?)
FERRITIN SERPL-MCNC: 257 NG/ML (ref 11.1–264)
FOLATE: 12.6 NG/ML
GFR (AFRICAN AMERICAN): 151 ML/MIN (ref 60–?)
GLOBULIN SER CALC-MCNC: 2.8 G/DL (ref 1.3–3.2)
GLUCOSE: 273 MG/DL (ref 74–100)
HBA1C MFR BLD: 10.2 % (ref 4–6)
HCT VFR BLD CALC: 45.2 % (ref 37–47)
HDLC SERPL-MCNC: 49 MG/DL (ref 40–60)
HGB BLD-MCNC: 14.7 G/DL (ref 12.2–16.2)
MAGNESIUM: 1.5 MG/DL (ref 1.6–2.3)
MCHC RBC-ENTMCNC: 32.6 G/DL (ref 31.8–35.4)
MCV RBC: 90.2 FL (ref 81–99)
MEAN CORPUSCULAR HEMOGLOBIN: 29.4 PG (ref 27–31.2)
PLATELET # BLD: 193 K/MM3 (ref 142–424)
POTASSIUM: 4.1 MMOL/L (ref 3.5–5.1)
PROT SERPL-MCNC: 6.8 G/DL (ref 6.3–8.2)
RBC # BLD AUTO: 5.01 M/MM3 (ref 4.2–5.4)
SODIUM SPEC-SCNC: 133 MMOL/L (ref 136–145)
TRIGLYCERIDES: 114 MG/DL (ref 30–150)
TSH SERPL-ACNC: 1.42 UIU/ML (ref 0.47–4.68)
VITAMIN B12: 884 PG/ML (ref 239–931)
WBC # BLD AUTO: 6.7 K/MM3 (ref 4.8–10.8)

## 2024-10-01 PROCEDURE — 80061 LIPID PANEL: CPT

## 2024-10-01 PROCEDURE — 87186 SC STD MICRODIL/AGAR DIL: CPT

## 2024-10-01 PROCEDURE — 85025 COMPLETE CBC W/AUTO DIFF WBC: CPT

## 2024-10-01 PROCEDURE — 82728 ASSAY OF FERRITIN: CPT

## 2024-10-01 PROCEDURE — 83036 HEMOGLOBIN GLYCOSYLATED A1C: CPT

## 2024-10-01 PROCEDURE — 87086 URINE CULTURE/COLONY COUNT: CPT

## 2024-10-01 PROCEDURE — 82607 VITAMIN B-12: CPT

## 2024-10-01 PROCEDURE — 82746 ASSAY OF FOLIC ACID SERUM: CPT

## 2024-10-01 PROCEDURE — 82306 VITAMIN D 25 HYDROXY: CPT

## 2024-10-01 PROCEDURE — 80053 COMPREHEN METABOLIC PANEL: CPT

## 2024-10-01 PROCEDURE — 87088 URINE BACTERIA CULTURE: CPT

## 2024-10-01 PROCEDURE — 84443 ASSAY THYROID STIM HORMONE: CPT

## 2024-10-01 PROCEDURE — 80050 GENERAL HEALTH PANEL: CPT

## 2024-10-01 PROCEDURE — 83735 ASSAY OF MAGNESIUM: CPT

## 2024-10-17 ENCOUNTER — HOSPITAL ENCOUNTER (OUTPATIENT)
Age: 64
Discharge: HOME | End: 2024-10-17
Payer: COMMERCIAL

## 2024-10-17 VITALS
RESPIRATION RATE: 16 BRPM | SYSTOLIC BLOOD PRESSURE: 168 MMHG | OXYGEN SATURATION: 96 % | HEART RATE: 88 BPM | DIASTOLIC BLOOD PRESSURE: 115 MMHG

## 2024-10-17 VITALS — BODY MASS INDEX: 38 KG/M2

## 2024-10-17 DIAGNOSIS — M51.16: Primary | ICD-10-CM

## 2024-10-17 PROCEDURE — G0463 HOSPITAL OUTPT CLINIC VISIT: HCPCS

## 2024-10-17 PROCEDURE — 99212 OFFICE O/P EST SF 10 MIN: CPT

## 2024-10-29 ENCOUNTER — HOSPITAL ENCOUNTER (OUTPATIENT)
Dept: HOSPITAL 22 - LAB | Age: 64
Discharge: HOME | End: 2024-10-29
Payer: COMMERCIAL

## 2024-10-29 DIAGNOSIS — N39.0: Primary | ICD-10-CM

## 2024-10-29 DIAGNOSIS — R00.2: ICD-10-CM

## 2024-10-29 LAB
COLOR UR: YELLOW
GLUCOSE UR QL STRIP.AUTO: (no result)
KETONES UR STRIP.AUTO-MCNC: (no result) MG/DL
MICRO URNS: (no result)
PH UR: 5.5 [PH] (ref 5–8.5)
SP GR UR: 1.02 (ref 1–1.03)
TRANS CELLS URNS QL MICRO: (no result) #/LPF (ref 0–3)
UROBILINOGEN UR QL: 0.2 EU/DL

## 2024-10-29 PROCEDURE — 87086 URINE CULTURE/COLONY COUNT: CPT

## 2024-10-29 PROCEDURE — 93225 XTRNL ECG REC<48 HRS REC: CPT

## 2024-10-29 PROCEDURE — 93227 XTRNL ECG REC<48 HR R&I: CPT

## 2024-10-29 PROCEDURE — 81001 URINALYSIS AUTO W/SCOPE: CPT

## 2024-10-30 ENCOUNTER — HOSPITAL ENCOUNTER (EMERGENCY)
Age: 64
Discharge: HOME | End: 2024-10-30
Payer: COMMERCIAL

## 2024-10-30 VITALS
SYSTOLIC BLOOD PRESSURE: 169 MMHG | RESPIRATION RATE: 18 BRPM | TEMPERATURE: 98.7 F | DIASTOLIC BLOOD PRESSURE: 93 MMHG | HEART RATE: 88 BPM | BODY MASS INDEX: 37.9 KG/M2 | OXYGEN SATURATION: 99 %

## 2024-10-30 VITALS
RESPIRATION RATE: 20 BRPM | TEMPERATURE: 98.06 F | DIASTOLIC BLOOD PRESSURE: 84 MMHG | OXYGEN SATURATION: 97 % | HEART RATE: 67 BPM | SYSTOLIC BLOOD PRESSURE: 148 MMHG

## 2024-10-30 VITALS
SYSTOLIC BLOOD PRESSURE: 163 MMHG | RESPIRATION RATE: 14 BRPM | HEART RATE: 76 BPM | DIASTOLIC BLOOD PRESSURE: 100 MMHG | OXYGEN SATURATION: 95 %

## 2024-10-30 VITALS
HEART RATE: 75 BPM | OXYGEN SATURATION: 94 % | RESPIRATION RATE: 16 BRPM | SYSTOLIC BLOOD PRESSURE: 169 MMHG | DIASTOLIC BLOOD PRESSURE: 93 MMHG

## 2024-10-30 VITALS — HEART RATE: 85 BPM

## 2024-10-30 VITALS
SYSTOLIC BLOOD PRESSURE: 150 MMHG | OXYGEN SATURATION: 99 % | HEART RATE: 71 BPM | RESPIRATION RATE: 19 BRPM | DIASTOLIC BLOOD PRESSURE: 86 MMHG

## 2024-10-30 DIAGNOSIS — R06.02: ICD-10-CM

## 2024-10-30 DIAGNOSIS — R79.89: ICD-10-CM

## 2024-10-30 DIAGNOSIS — I49.3: Primary | ICD-10-CM

## 2024-10-30 DIAGNOSIS — R00.2: ICD-10-CM

## 2024-10-30 DIAGNOSIS — R11.0: ICD-10-CM

## 2024-10-30 DIAGNOSIS — R07.9: ICD-10-CM

## 2024-10-30 LAB
ALBUMIN LEVEL: 4 G/DL (ref 3.5–5)
ALBUMIN/GLOB SERPL: 1.3 {RATIO} (ref 1.1–1.8)
ALP ISO SERPL-ACNC: 117 U/L (ref 38–126)
ALT SERPLBLD-CCNC: 32 U/L (ref 12–78)
ANION GAP SERPL CALC-SCNC: 12.1 MEQ/L (ref 5–15)
AST SERPL QL: 34 U/L (ref 14–36)
BILIRUBIN,TOTAL: 1.1 MG/DL (ref 0.2–1.3)
BUN SERPL-MCNC: 11 MG/DL (ref 7–17)
CALCIUM SPEC-MCNC: 9 MG/DL (ref 8.4–10.2)
CHLORIDE SPEC-SCNC: 105 MMOL/L (ref 98–107)
CO2 SERPL-SCNC: 25 MMOL/L (ref 22–30)
CREAT BLD-SCNC: 0.6 MG/DL (ref 0.52–1.04)
CREATININE CLEARANCE ESTIMATED: 100 ML/MIN (ref 50–200)
D-DIMER: 0.47 UG/ML (ref 0–0.5)
ESTIMATED GLOMERULAR FILT RATE: 101 ML/MIN (ref 60–?)
GFR (AFRICAN AMERICAN): 122 ML/MIN (ref 60–?)
GLOBULIN SER CALC-MCNC: 3.2 G/DL (ref 1.3–3.2)
GLUCOSE: 250 MG/DL (ref 74–100)
HCT VFR BLD CALC: 39.5 % (ref 37–47)
HGB BLD-MCNC: 13.9 G/DL (ref 12.2–16.2)
HIV 1 + 2 AB PNL SERPLBLD IA.RAPID: NONREACTIVE
MAGNESIUM: 1.6 MG/DL (ref 1.6–2.3)
MCHC RBC-ENTMCNC: 35.1 G/DL (ref 31.8–35.4)
MCV RBC: 84.7 FL (ref 81–99)
MEAN CORPUSCULAR HEMOGLOBIN: 29.7 PG (ref 27–31.2)
PHOSPHOROUS: 3.3 MG/DL (ref 2.5–4.5)
PLATELET # BLD: 167 K/MM3 (ref 142–424)
POTASSIUM: 4.1 MMOL/L (ref 3.5–5.1)
PROT SERPL-MCNC: 7.2 G/DL (ref 6.3–8.2)
RBC # BLD AUTO: 4.67 M/MM3 (ref 4.2–5.4)
SODIUM SPEC-SCNC: 138 MMOL/L (ref 136–145)
T4 (THYROXINE): 14.3 UG/DL (ref 5.53–11)
TROPONIN I: < 0.01 NG/ML (ref 0–0.03)
TROPONIN I: < 0.01 NG/ML (ref 0–0.03)
TSH SERPL-ACNC: 1.11 UIU/ML (ref 0.47–4.68)
WBC # BLD AUTO: 7.6 K/MM3 (ref 4.8–10.8)

## 2024-10-30 PROCEDURE — 83735 ASSAY OF MAGNESIUM: CPT

## 2024-10-30 PROCEDURE — 86803 HEPATITIS C AB TEST: CPT

## 2024-10-30 PROCEDURE — 87389 HIV-1 AG W/HIV-1&-2 AB AG IA: CPT

## 2024-10-30 PROCEDURE — 96365 THER/PROPH/DIAG IV INF INIT: CPT

## 2024-10-30 PROCEDURE — 85378 FIBRIN DEGRADE SEMIQUANT: CPT

## 2024-10-30 PROCEDURE — 85025 COMPLETE CBC W/AUTO DIFF WBC: CPT

## 2024-10-30 PROCEDURE — 84436 ASSAY OF TOTAL THYROXINE: CPT

## 2024-10-30 PROCEDURE — 84100 ASSAY OF PHOSPHORUS: CPT

## 2024-10-30 PROCEDURE — 80050 GENERAL HEALTH PANEL: CPT

## 2024-10-30 PROCEDURE — 99284 EMERGENCY DEPT VISIT MOD MDM: CPT

## 2024-10-30 PROCEDURE — 93005 ELECTROCARDIOGRAM TRACING: CPT

## 2024-10-30 PROCEDURE — 71046 X-RAY EXAM CHEST 2 VIEWS: CPT

## 2024-10-30 PROCEDURE — 84443 ASSAY THYROID STIM HORMONE: CPT

## 2024-10-30 PROCEDURE — 80053 COMPREHEN METABOLIC PANEL: CPT

## 2024-10-30 PROCEDURE — 84484 ASSAY OF TROPONIN QUANT: CPT

## 2024-11-05 ENCOUNTER — HOSPITAL ENCOUNTER (OUTPATIENT)
Dept: HOSPITAL 22 - RT | Age: 64
Discharge: HOME | End: 2024-11-05
Payer: COMMERCIAL

## 2024-11-05 DIAGNOSIS — R00.2: Primary | ICD-10-CM

## 2024-11-05 DIAGNOSIS — R06.09: ICD-10-CM

## 2024-11-05 DIAGNOSIS — R07.89: ICD-10-CM

## 2024-11-05 PROCEDURE — 93270 REMOTE 30 DAY ECG REV/REPORT: CPT

## 2024-11-07 ENCOUNTER — HOSPITAL ENCOUNTER (OUTPATIENT)
Dept: HOSPITAL 22 - RAD | Age: 64
Discharge: HOME | End: 2024-11-07
Payer: COMMERCIAL

## 2024-11-07 DIAGNOSIS — Z12.31: Primary | ICD-10-CM

## 2024-11-07 PROCEDURE — 77063 BREAST TOMOSYNTHESIS BI: CPT

## 2024-11-07 PROCEDURE — 77067 SCR MAMMO BI INCL CAD: CPT

## 2024-11-15 ENCOUNTER — HOSPITAL ENCOUNTER (OUTPATIENT)
Dept: HOSPITAL 22 - LAB.DROPOF | Age: 64
Discharge: HOME | End: 2024-11-15
Payer: COMMERCIAL

## 2024-11-15 DIAGNOSIS — Z90.710: ICD-10-CM

## 2024-11-15 DIAGNOSIS — E83.42: Primary | ICD-10-CM

## 2024-11-15 LAB
ALBUMIN LEVEL: 3.8 G/DL (ref 3.5–5)
ALBUMIN/GLOB SERPL: 1.4 {RATIO} (ref 1.1–1.8)
ALP ISO SERPL-ACNC: 126 U/L (ref 38–126)
ALT SERPLBLD-CCNC: 31 U/L (ref 12–78)
ANION GAP SERPL CALC-SCNC: 13.3 MEQ/L (ref 5–15)
AST SERPL QL: 34 U/L (ref 14–36)
BILIRUBIN,TOTAL: 0.8 MG/DL (ref 0.2–1.3)
BUN SERPL-MCNC: 11 MG/DL (ref 7–17)
CALCIUM SPEC-MCNC: 9 MG/DL (ref 8.4–10.2)
CHLORIDE SPEC-SCNC: 104 MMOL/L (ref 98–107)
CO2 SERPL-SCNC: 25 MMOL/L (ref 22–30)
CREAT BLD-SCNC: 0.5 MG/DL (ref 0.52–1.04)
ESTIMATED GLOMERULAR FILT RATE: 125 ML/MIN (ref 60–?)
GFR (AFRICAN AMERICAN): 151 ML/MIN (ref 60–?)
GLOBULIN SER CALC-MCNC: 2.8 G/DL (ref 1.3–3.2)
GLUCOSE: 184 MG/DL (ref 74–100)
HCT VFR BLD CALC: 43.8 % (ref 37–47)
HGB BLD-MCNC: 14.8 G/DL (ref 12.2–16.2)
MAGNESIUM: 1.8 MG/DL (ref 1.6–2.3)
MCHC RBC-ENTMCNC: 33.9 G/DL (ref 31.8–35.4)
MCV RBC: 85.3 FL (ref 81–99)
MEAN CORPUSCULAR HEMOGLOBIN: 28.9 PG (ref 27–31.2)
PLATELET # BLD: 178 K/MM3 (ref 142–424)
POTASSIUM: 4.3 MMOL/L (ref 3.5–5.1)
PROT SERPL-MCNC: 6.6 G/DL (ref 6.3–8.2)
RBC # BLD AUTO: 5.13 M/MM3 (ref 4.2–5.4)
SODIUM SPEC-SCNC: 138 MMOL/L (ref 136–145)
WBC # BLD AUTO: 6.7 K/MM3 (ref 4.8–10.8)

## 2024-11-15 PROCEDURE — 85025 COMPLETE CBC W/AUTO DIFF WBC: CPT

## 2024-11-15 PROCEDURE — 83735 ASSAY OF MAGNESIUM: CPT

## 2024-11-15 PROCEDURE — 80053 COMPREHEN METABOLIC PANEL: CPT

## 2024-11-20 ENCOUNTER — HOSPITAL ENCOUNTER (OUTPATIENT)
Age: 64
Discharge: HOME | End: 2024-11-20
Payer: COMMERCIAL

## 2024-11-20 ENCOUNTER — HOSPITAL ENCOUNTER (EMERGENCY)
Age: 64
Discharge: HOME | End: 2024-11-20
Payer: COMMERCIAL

## 2024-11-20 VITALS
SYSTOLIC BLOOD PRESSURE: 147 MMHG | TEMPERATURE: 98.24 F | OXYGEN SATURATION: 98 % | RESPIRATION RATE: 20 BRPM | DIASTOLIC BLOOD PRESSURE: 90 MMHG | HEART RATE: 70 BPM

## 2024-11-20 VITALS
RESPIRATION RATE: 20 BRPM | TEMPERATURE: 98.2 F | OXYGEN SATURATION: 98 % | DIASTOLIC BLOOD PRESSURE: 90 MMHG | HEART RATE: 70 BPM | SYSTOLIC BLOOD PRESSURE: 147 MMHG

## 2024-11-20 VITALS — BODY MASS INDEX: 37.9 KG/M2

## 2024-11-20 DIAGNOSIS — R07.89: ICD-10-CM

## 2024-11-20 DIAGNOSIS — R00.2: ICD-10-CM

## 2024-11-20 DIAGNOSIS — T21.21XA: Primary | ICD-10-CM

## 2024-11-20 DIAGNOSIS — K74.60: ICD-10-CM

## 2024-11-20 DIAGNOSIS — R06.09: Primary | ICD-10-CM
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
## 2024-11-20 DIAGNOSIS — E11.9: ICD-10-CM

## 2024-11-20 PROCEDURE — 99213 OFFICE O/P EST LOW 20 MIN: CPT

## 2024-11-20 PROCEDURE — 87077 CULTURE AEROBIC IDENTIFY: CPT

## 2024-11-20 PROCEDURE — 87186 SC STD MICRODIL/AGAR DIL: CPT

## 2024-11-20 PROCEDURE — 76705 ECHO EXAM OF ABDOMEN: CPT

## 2024-11-20 PROCEDURE — G0381 LEV 2 HOSP TYPE B ED VISIT: HCPCS

## 2024-11-20 PROCEDURE — 87070 CULTURE OTHR SPECIMN AEROBIC: CPT

## 2024-11-20 PROCEDURE — 87205 SMEAR GRAM STAIN: CPT

## 2024-11-20 PROCEDURE — 96372 THER/PROPH/DIAG INJ SC/IM: CPT

## 2024-11-20 PROCEDURE — 93306 TTE W/DOPPLER COMPLETE: CPT

## 2024-12-04 ENCOUNTER — HOSPITAL ENCOUNTER (OUTPATIENT)
Age: 64
Discharge: HOME | End: 2024-12-04
Payer: COMMERCIAL

## 2024-12-04 VITALS — OXYGEN SATURATION: 97 % | DIASTOLIC BLOOD PRESSURE: 86 MMHG | HEART RATE: 59 BPM | SYSTOLIC BLOOD PRESSURE: 155 MMHG

## 2024-12-04 VITALS
HEART RATE: 63 BPM | RESPIRATION RATE: 18 BRPM | DIASTOLIC BLOOD PRESSURE: 76 MMHG | SYSTOLIC BLOOD PRESSURE: 149 MMHG | OXYGEN SATURATION: 94 %

## 2024-12-04 VITALS
SYSTOLIC BLOOD PRESSURE: 169 MMHG | DIASTOLIC BLOOD PRESSURE: 96 MMHG | OXYGEN SATURATION: 97 % | RESPIRATION RATE: 18 BRPM | HEART RATE: 75 BPM

## 2024-12-04 VITALS
RESPIRATION RATE: 18 BRPM | SYSTOLIC BLOOD PRESSURE: 177 MMHG | DIASTOLIC BLOOD PRESSURE: 78 MMHG | HEART RATE: 68 BPM | OXYGEN SATURATION: 97 %

## 2024-12-04 VITALS — BODY MASS INDEX: 37.9 KG/M2

## 2024-12-04 DIAGNOSIS — R07.9: ICD-10-CM

## 2024-12-04 DIAGNOSIS — R06.09: ICD-10-CM

## 2024-12-04 DIAGNOSIS — R94.31: Primary | ICD-10-CM

## 2024-12-04 PROCEDURE — 75574 CT ANGIO HRT W/3D IMAGE: CPT

## 2025-01-13 ENCOUNTER — HOSPITAL ENCOUNTER (OUTPATIENT)
Dept: HOSPITAL 22 - LAB.DROPOF | Age: 65
Discharge: HOME | End: 2025-01-13
Payer: MEDICAID

## 2025-01-13 DIAGNOSIS — E11.65: ICD-10-CM

## 2025-01-13 DIAGNOSIS — Z79.84: ICD-10-CM

## 2025-01-13 DIAGNOSIS — E11.9: ICD-10-CM

## 2025-01-13 DIAGNOSIS — E55.9: Primary | ICD-10-CM

## 2025-01-13 LAB
25-OH VITAMIN D, TOTAL: 29.9 NG/ML (ref 30–100)
ALBUMIN LEVEL: 3.9 G/DL (ref 3.5–5)
ALBUMIN/GLOB SERPL: 1.3 {RATIO} (ref 1.1–1.8)
ALP ISO SERPL-ACNC: 112 U/L (ref 38–126)
ALT SERPLBLD-CCNC: 32 U/L (ref 12–78)
ANION GAP SERPL CALC-SCNC: 8.3 MEQ/L (ref 5–15)
AST SERPL QL: 34 U/L (ref 14–36)
BILIRUBIN,TOTAL: 0.9 MG/DL (ref 0.2–1.3)
BUN SERPL-MCNC: 14 MG/DL (ref 7–17)
CALCIUM SPEC-MCNC: 9.3 MG/DL (ref 8.4–10.2)
CHLORIDE SPEC-SCNC: 105 MMOL/L (ref 98–107)
CHOLEST SPEC-SCNC: 175 MG/DL (ref 140–200)
CO2 SERPL-SCNC: 26 MMOL/L (ref 22–30)
CREAT BLD-SCNC: 0.6 MG/DL (ref 0.52–1.04)
ESTIMATED GLOMERULAR FILT RATE: 101 ML/MIN (ref 60–?)
GFR (AFRICAN AMERICAN): 122 ML/MIN (ref 60–?)
GLOBULIN SER CALC-MCNC: 2.9 G/DL (ref 1.3–3.2)
GLUCOSE: 156 MG/DL (ref 74–100)
HBA1C MFR BLD: 7.5 % (ref 4–6)
HCT VFR BLD CALC: 40.6 % (ref 37–47)
HDLC SERPL-MCNC: 47 MG/DL (ref 40–60)
HGB BLD-MCNC: 13.4 G/DL (ref 12.2–16.2)
MAGNESIUM: 1.7 MG/DL (ref 1.6–2.3)
MCHC RBC-ENTMCNC: 33 G/DL (ref 31.8–35.4)
MCV RBC: 84.6 FL (ref 81–99)
MEAN CORPUSCULAR HEMOGLOBIN: 27.9 PG (ref 27–31.2)
PLATELET # BLD: 151 K/MM3 (ref 142–424)
POTASSIUM: 4.3 MMOL/L (ref 3.5–5.1)
PROT SERPL-MCNC: 6.8 G/DL (ref 6.3–8.2)
RBC # BLD AUTO: 4.8 M/MM3 (ref 4.2–5.4)
SODIUM SPEC-SCNC: 135 MMOL/L (ref 136–145)
TRIGLYCERIDES: 67 MG/DL (ref 30–150)
TSH SERPL-ACNC: 1.4 UIU/ML (ref 0.47–4.68)
WBC # BLD AUTO: 5.3 K/MM3 (ref 4.8–10.8)

## 2025-01-13 PROCEDURE — 80053 COMPREHEN METABOLIC PANEL: CPT

## 2025-01-13 PROCEDURE — 83036 HEMOGLOBIN GLYCOSYLATED A1C: CPT

## 2025-01-13 PROCEDURE — 82306 VITAMIN D 25 HYDROXY: CPT

## 2025-01-13 PROCEDURE — 80061 LIPID PANEL: CPT

## 2025-01-13 PROCEDURE — 83735 ASSAY OF MAGNESIUM: CPT

## 2025-01-13 PROCEDURE — 84443 ASSAY THYROID STIM HORMONE: CPT

## 2025-01-13 PROCEDURE — 85025 COMPLETE CBC W/AUTO DIFF WBC: CPT

## 2025-02-25 ENCOUNTER — HOSPITAL ENCOUNTER (OUTPATIENT)
Dept: HOSPITAL 22 - LAB.DROPOF | Age: 65
Discharge: HOME | End: 2025-02-25
Payer: MEDICAID

## 2025-02-25 DIAGNOSIS — R30.0: Primary | ICD-10-CM

## 2025-02-25 PROCEDURE — 87086 URINE CULTURE/COLONY COUNT: CPT

## 2025-04-03 ENCOUNTER — HOSPITAL ENCOUNTER (OUTPATIENT)
Dept: HOSPITAL 22 - RAD | Age: 65
Discharge: HOME | End: 2025-04-03
Payer: MEDICAID

## 2025-04-03 DIAGNOSIS — M53.3: ICD-10-CM

## 2025-04-03 DIAGNOSIS — M54.50: Primary | ICD-10-CM

## 2025-04-03 PROCEDURE — 72202 X-RAY EXAM SI JOINTS 3/> VWS: CPT

## 2025-04-03 PROCEDURE — 72100 X-RAY EXAM L-S SPINE 2/3 VWS: CPT

## 2025-04-21 ENCOUNTER — HOSPITAL ENCOUNTER (OUTPATIENT)
Dept: HOSPITAL 22 - PT | Age: 65
Discharge: HOME | End: 2025-04-21
Payer: MEDICAID

## 2025-04-21 DIAGNOSIS — M53.3: Primary | ICD-10-CM

## 2025-04-21 DIAGNOSIS — M54.50: ICD-10-CM

## 2025-04-21 PROCEDURE — 97163 PT EVAL HIGH COMPLEX 45 MIN: CPT
